# Patient Record
Sex: MALE | Race: WHITE | NOT HISPANIC OR LATINO | URBAN - METROPOLITAN AREA
[De-identification: names, ages, dates, MRNs, and addresses within clinical notes are randomized per-mention and may not be internally consistent; named-entity substitution may affect disease eponyms.]

---

## 2022-04-05 ENCOUNTER — OFFICE (OUTPATIENT)
Dept: URBAN - METROPOLITAN AREA CLINIC 34 | Facility: CLINIC | Age: 52
End: 2022-04-05

## 2022-04-05 VITALS
SYSTOLIC BLOOD PRESSURE: 128 MMHG | WEIGHT: 135 LBS | TEMPERATURE: 98.2 F | HEIGHT: 68 IN | DIASTOLIC BLOOD PRESSURE: 95 MMHG | HEART RATE: 103 BPM

## 2022-04-05 DIAGNOSIS — R63.4 ABNORMAL WEIGHT LOSS: ICD-10-CM

## 2022-04-05 DIAGNOSIS — R10.84 GENERALIZED ABDOMINAL PAIN: ICD-10-CM

## 2022-04-05 DIAGNOSIS — F31.9 BIPOLAR DISORDER, UNSPECIFIED: ICD-10-CM

## 2022-04-05 DIAGNOSIS — K59.00 CONSTIPATION, UNSPECIFIED: ICD-10-CM

## 2022-04-05 PROCEDURE — 99205 OFFICE O/P NEW HI 60 MIN: CPT

## 2022-04-05 NOTE — SERVICEHPINOTES
TOMI DORAN   is a   51   year old male who is being seen in consultation at the request of   PATIRC STINSON   for IBS. H/o bipolar years ago. Reports 20 years ago had CCY.
br Always had issues w/ constipation, no issues w/ diarrhea after CCY. Around 2009 reports abd bloating which caused a fib? ever since, has "never been the same". A lot of foods upset his stomach. Has done low fodmap diet and various elimination diets.  
br
Was 190lbs, now 135. States labs have been normal aside from low b12 from not eating, now getting injections. 
br Weight loss slowly over past 5 years, a bit more rapid in past 2 years. 
br Occasionally feels a "twinge" in abdomen, which starts a "flare" with abd pain and bloating. Generalized abd pain. 
br Has gone up to 22 days without BM. Has started to not eat on Sundays then has "diarrhea" on Mondays. Bloating and pain improves after BMs. No rectal bleeding or melena. 
br Takes omeprazole daily which works well, if he misses a day will have returning sx, acid reflux/heartburn. Has been on PPI x 10 years. Denies dysphagia or odynophagia. No n/v. No family hx of GI issues. mother recently passed away from lung cancer.
br No prior EGD/colonoscopy or recent imaging.  He states he cannot have a colonoscopy as he "will die" and cause a flare up of IBS. pt states he is "psychotic" and this may be the reason he has so many GI issues.